# Patient Record
Sex: MALE | Race: NATIVE HAWAIIAN OR OTHER PACIFIC ISLANDER | URBAN - METROPOLITAN AREA
[De-identification: names, ages, dates, MRNs, and addresses within clinical notes are randomized per-mention and may not be internally consistent; named-entity substitution may affect disease eponyms.]

---

## 2018-02-15 ENCOUNTER — OFFICE VISIT (OUTPATIENT)
Dept: FAMILY MEDICINE | Facility: CLINIC | Age: 53
End: 2018-02-15

## 2018-02-15 VITALS
RESPIRATION RATE: 16 BRPM | TEMPERATURE: 98.3 F | HEIGHT: 65 IN | WEIGHT: 210 LBS | HEART RATE: 87 BPM | BODY MASS INDEX: 34.99 KG/M2 | DIASTOLIC BLOOD PRESSURE: 132 MMHG | SYSTOLIC BLOOD PRESSURE: 200 MMHG | OXYGEN SATURATION: 97 %

## 2018-02-15 DIAGNOSIS — E11.8 TYPE 2 DIABETES MELLITUS WITH COMPLICATION, WITHOUT LONG-TERM CURRENT USE OF INSULIN (H): ICD-10-CM

## 2018-02-15 DIAGNOSIS — I10 BENIGN ESSENTIAL HYPERTENSION: Primary | ICD-10-CM

## 2018-02-15 LAB
ALBUMIN SERPL-MCNC: 3.4 G/DL (ref 3.4–5)
ALP SERPL-CCNC: 130 U/L (ref 40–150)
ALT SERPL W P-5'-P-CCNC: 98 U/L (ref 0–70)
ANION GAP SERPL CALCULATED.3IONS-SCNC: 11 MMOL/L (ref 3–14)
AST SERPL W P-5'-P-CCNC: 31 U/L (ref 0–45)
BILIRUB SERPL-MCNC: 0.4 MG/DL (ref 0.2–1.3)
BUN SERPL-MCNC: 12 MG/DL (ref 7–30)
CALCIUM SERPL-MCNC: 8.6 MG/DL (ref 8.5–10.1)
CHLORIDE SERPL-SCNC: 102 MMOL/L (ref 94–109)
CHOLEST SERPL-MCNC: 279 MG/DL
CO2 SERPL-SCNC: 28 MMOL/L (ref 20–32)
CREAT SERPL-MCNC: 0.64 MG/DL (ref 0.66–1.25)
GFR SERPL CREATININE-BSD FRML MDRD: >90 ML/MIN/1.7M2
GLUCOSE SERPL-MCNC: 339 MG/DL (ref 70–99)
HBA1C MFR BLD: 11 % (ref 4.3–6)
HDLC SERPL-MCNC: 33 MG/DL
LDLC SERPL CALC-MCNC: ABNORMAL MG/DL
NONHDLC SERPL-MCNC: 246 MG/DL
POTASSIUM SERPL-SCNC: 3.3 MMOL/L (ref 3.4–5.3)
PROT SERPL-MCNC: 7.6 G/DL (ref 6.8–8.8)
SODIUM SERPL-SCNC: 141 MMOL/L (ref 133–144)
TRIGL SERPL-MCNC: 1204 MG/DL

## 2018-02-15 RX ORDER — HYDROCHLOROTHIAZIDE 12.5 MG/1
12.5 CAPSULE ORAL EVERY MORNING
Qty: 30 CAPSULE | Refills: 0 | Status: SHIPPED | OUTPATIENT
Start: 2018-02-15

## 2018-02-15 RX ORDER — LOSARTAN POTASSIUM 25 MG/1
25 TABLET ORAL DAILY
Qty: 30 TABLET | Refills: 0 | Status: SHIPPED | OUTPATIENT
Start: 2018-02-15 | End: 2018-03-17

## 2018-02-15 RX ORDER — ATORVASTATIN CALCIUM 40 MG/1
40 TABLET, FILM COATED ORAL AT BEDTIME
Qty: 30 TABLET | Refills: 0 | Status: SHIPPED | OUTPATIENT
Start: 2018-02-15

## 2018-02-15 RX ORDER — IBUPROFEN 200 MG
200 TABLET ORAL EVERY 4 HOURS PRN
COMMUNITY

## 2018-02-15 NOTE — Clinical Note
I have completed my note, please review, add tie in statement and close encounter.   Thanks!   Eileen [email or web address]

## 2018-02-15 NOTE — MR AVS SNAPSHOT
After Visit Summary   2/15/2018    Shon Adan    MRN: [id number]           Patient Information     Date Of Birth          1965        Visit Information        Provider Department      2/15/2018 6:00 PM Clinician, [facility]        Today's Diagnoses     Benign essential hypertension    -  1    Type 2 diabetes mellitus with complication, without long-term current use of insulin (H)          Care Instructions    Patient Visit Summary    Patient Name: Shon Adan  MRN: [id number]    Date of Visit: 2/15/2018    Principle Diagnosis: Hypertension, uncontrolled  Diabetes Mellitus Type II    Physician's Recommendations/Instructions: Start all medications as directed. Check your blood pressure tomorrow morning and follow up in the clinic next week to make sure your blood pressure and glucose levels are controlled by your medications. In the meantime, if you begin to experience symptoms related to your elevated blood pressure, report to the emergency room or urgent care immediately. Symptoms include: severe headache, lightheadedness, dizziness, severe abdominal pain, or changes in vision (i.e. spotted vision or darkening as if a shade is falling over your eyes).     To prevent cuts or development of ulcers on your feet, be sure to check your feet regularly. Changing your socks while working long shifts will help keep your feet dry and prevent cracking of the skin.    Please join us for the following specialty nights:  Ophthalmology Night (eye care): 3/26/2018  (Optional: Foot Night: 2/22/2018)      Lab Tests Performed: Hemoglobin A1c, Lipid Panel, Comprehensive Metabolic Panel, Glucose     Follow Up/Results: Please return to the clinic within one week (Monday or Thursday) to recheck blood pressure and discuss any changes to medications or symptoms. We will call you with your results in the meantime.      Physician: Dr. Devon Callahan  [facility]          Follow-ups after your  "visit        Who to contact     Please call your clinic at [phone] to:    Ask questions about your health    Make or cancel appointments    Discuss your medicines    Learn about your test results    Speak to your doctor            Additional Information About Your Visit        iCopyrighthart Information     Cenify is an electronic gateway that provides easy, online access to your medical records. With Cenify, you can request a clinic appointment, read your test results, renew a prescription or communicate with your care team.     To sign up for Cenify visit the website at [email or web address]   You will be asked to enter the access code listed below, as well as some personal information. Please follow the directions to create your username and password.     Your access code is: [email or web address]  Expires: 2018  7:58 PM     Your access code will  in 90 days. If you need help or a new code, please contact your [facility] or call [phone] for assistance.        Care EveryWhere ID     This is your Care EveryWhere ID. This could be used by other organizations to access your [place] medical records  [id number]        Your Vitals Were     Pulse Temperature Respirations Height Pulse Oximetry BMI (Body Mass Index)    87 98.3  F (36.8  C) (Oral) 16 5' 5\" (165.1 cm) 97% 34.95 kg/m2       Blood Pressure from Last 3 Encounters:   02/15/18 (!) 200/132    Weight from Last 3 Encounters:   02/15/18 210 lb (95.3 kg)              We Performed the Following     Comprehensive metabolic panel     Hemoglobin A1c     Lipid panel reflex to direct LDL Fasting          Today's Medication Changes          These changes are accurate as of 2/15/18  8:47 PM.  If you have any questions, ask your nurse or doctor.               Start taking these medicines.        Dose/Directions    aspirin 81 MG EC tablet   Used for:  Type 2 diabetes mellitus with complication, without long-term current use of " insulin (H)   Started by:  Dali Ragland Ump        Dose:  81 mg   Take 1 tablet (81 mg) by mouth daily   Quantity:  30 tablet   Refills:  0       atorvastatin 40 MG tablet   Commonly known as:  LIPITOR   Used for:  Benign essential hypertension, Type 2 diabetes mellitus with complication, without long-term current use of insulin (H)   Started by:  Dali Ragland Ump        Dose:  40 mg   Take 1 tablet (40 mg) by mouth At Bedtime   Quantity:  30 tablet   Refills:  0       hydrochlorothiazide 12.5 MG capsule   Commonly known as:  MICROZIDE   Used for:  Benign essential hypertension   Started by:  Dali Ragland Ump        Dose:  12.5 mg   Take 1 capsule (12.5 mg) by mouth every morning   Quantity:  30 capsule   Refills:  0       losartan 25 MG tablet   Commonly known as:  COZAAR   Used for:  Type 2 diabetes mellitus with complication, without long-term current use of insulin (H)   Started by:  Dali Ragland Ump        Dose:  25 mg   Take 1 tablet (25 mg) by mouth daily   Quantity:  30 tablet   Refills:  0       metFORMIN 500 MG tablet   Commonly known as:  GLUCOPHAGE   Used for:  Type 2 diabetes mellitus with complication, without long-term current use of insulin (H)   Started by:  Dali Ragland Ump        Dose:  500 mg   Take 1 tablet (500 mg) by mouth daily (with breakfast)   Quantity:  30 tablet   Refills:  0            Where to get your medicines      Some of these will need a paper prescription and others can be bought over the counter.  Ask your nurse if you have questions.     Bring a paper prescription for each of these medications     aspirin 81 MG EC tablet    atorvastatin 40 MG tablet    hydrochlorothiazide 12.5 MG capsule    losartan 25 MG tablet    metFORMIN 500 MG tablet                Primary Care Provider    None Specified       No primary provider on file.        Equal Access to Services     [facility] MARISOL AH: Shayy Vega, derek castelan, armaan garnica  amina cornejochrisbarbara velasco'aabruce ah. So [facility] [phone].    ATENCIÓN: Si milady scales, tiene a hines disposición servicios gratuitos de asistencia lingüística. Christopher al [phone].    We comply with applicable federal civil rights laws and [place] laws. We do not discriminate on the basis of race, color, national origin, age, disability, sex, sexual orientation, or gender identity.            Thank you!     Thank you for choosing [facility]  for your care. Our goal is always to provide you with excellent care. Hearing back from our patients is one way we can continue to improve our services. Please take a few minutes to complete the written survey that you may receive in the mail after your visit with us. Thank you!             Your Updated Medication List - Protect others around you: Learn how to safely use, store and throw away your medicines at www.disposemymeds.org.          This list is accurate as of 2/15/18  8:47 PM.  Always use your most recent med list.                   Brand Name Dispense Instructions for use Diagnosis    aspirin 81 MG EC tablet     30 tablet    Take 1 tablet (81 mg) by mouth daily    Type 2 diabetes mellitus with complication, without long-term current use of insulin (H)       atorvastatin 40 MG tablet    LIPITOR    30 tablet    Take 1 tablet (40 mg) by mouth At Bedtime    Benign essential hypertension, Type 2 diabetes mellitus with complication, without long-term current use of insulin (H)       hydrochlorothiazide 12.5 MG capsule    MICROZIDE    30 capsule    Take 1 capsule (12.5 mg) by mouth every morning    Benign essential hypertension       ibuprofen 200 MG tablet    ADVIL/MOTRIN     Take 200 mg by mouth every 4 hours as needed        losartan 25 MG tablet    COZAAR    30 tablet    Take 1 tablet (25 mg) by mouth daily    Type 2 diabetes mellitus with complication, without long-term current use of insulin (H)       metFORMIN 500 MG tablet    GLUCOPHAGE    30 tablet    Take  1 tablet (500 mg) by mouth daily (with breakfast)    Type 2 diabetes mellitus with complication, without long-term current use of insulin (H)

## 2018-02-16 LAB — LDLC SERPL DIRECT ASSAY-MCNC: 128 MG/DL

## 2018-02-16 NOTE — PROGRESS NOTES
Patient was seen today for high blood pressure and diabetes. Patient saw [facility] this morning and BP was 200/142. BP was rechecked in clinic and was 200/132. Patient stopped all 3 BP medications 6-7 months ago, as well as stopped Metformin taken for diabetes 6-7 months ago as well. Patient complains of weakness, fatigue, and headaches possibly associated with not taking medications for his BP and diabetes. Patient says 1 BP medication makes him cough, but is not allergic to anything else that he knows of. These were the main chief complaints.    Patient however, also complains of shoulder pain that started 2 weeks ago after pulling fabric up off the ground. He says he takes 2 tablets of ibuprofen 2x/day for this. He said the pain started 2 weeks ago and says it hurts move and lift things, but not when not doing anything.    Patient also complains of problems with teeth and will be consulted with by dental clinicians.    Caffeine intake is 1-2 cups of coffee/day and 1-3 sodas per day. No alcohol, tobacco, illicit drugs, or herbal supplements.    Scored a PHQ-2 of 0.    sn Darshana

## 2018-02-16 NOTE — PROGRESS NOTES
MEDICINE NOTE    SUBJECTIVE:  Shon is a 52 year old male presenting to clinic today with a long standing history of hypertension and type II diabetes mellitus. He's here today requesting medications for both of these conditions. Shon has been off all medications for 7 months due to a loss of insurance, but is looking forward to getting treatment again as he knows it is important to treat these medical conditions. Prior to his loss of insurance, he had been treated for his hypertension for 15 years with three medications. He had a constant dry cough with lisinopril. Shon had also previously been treated for his diabetes with metformin. Shon has been hospitalized at least once for hypertension; at that time (over a year ago) his blood pressure was [id number]. He's presenting today because of a high blood pressure reading this morning at the [facility] and because he has noticed constant weakness, vision changes, increased thirst and urination, and numbness and tingling in his big toes in the past few months. His vision has become blurry, but he hasn't noticed any acute changes. Shon also reports a family history of high blood pressure, and seems well educated on the importance of diet and lifestyle. 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 He has experienced this several times in the past six months.     REVIEW OF SYSTEMS:  Gen: no fevers, some chills noted at night  Eyes: recent (past few months) of increased blurry vision   Ears, Noses, Mouth, Throat: no ringing in ears or hearing change  Cardiac: intermittent chest pain as described above  Lungs: no dyspnea, cough, or shortness of breath  GI: no nausea, vomiting, diarrhea or constipation, no abdominal pain  : polyuria in the past few weeks; urine is clear/light yellow  Musculoskeletal:  "shoulder injury two weeks ago causing discomfort  Neuro: reports numbness in big toes and intermittent finger numbness, generalized weakness  Endo: polyuria, polydipsia  Allergy: no environmental or drug allergies  Psych: no depression or anxiety    No past medical history on file.    No past surgical history on file.    No family history on file.    Social History     Social History     Marital status:      Spouse name: N/A     Number of children: N/A     Years of education: N/A     Occupational History     Not on file.     Social History Main Topics     Smoking status: Never Smoker     Smokeless tobacco: Not on file     Alcohol use No     Drug use: Not on file     Sexual activity: Not on file     Other Topics Concern     Not on file     Social History Narrative    2/15/18 Patient previously had health insurance through his full time job but switched jobs, and this new job does not offer insurance. He is interested in obtaining medical insurance. We offered him information on [facility] [place], which after learning about his social history we believe is his best option We gave him the phone number for a financial counselor for [facility] [place]. -KG, KP       OBJECTIVE:  Physical Exam:  BP (!) 200/132 (BP Location: Left arm)  Pulse 87  Temp 98.3  F (36.8  C) (Oral)  Resp 16  Ht 5' 5\" (165.1 cm)  Wt 210 lb (95.3 kg)  SpO2 97%  BMI 34.95 kg/m2  Constitutional: no distress, comfortable, pleasant   Eyes: anicteric, normal extra-ocular movements, no papilledema seen but some diabetes-related train tracking was seen on exam    Cardiovascular: regular rate and rhythm, normal S1 and S2, no murmurs, rubs or gallops, peripheral pulses full and symmetric   Respiratory: clear to auscultation, no wheezes or crackles, normal breath sounds   Gastrointestinal: positive bowel sounds, nontender, no hepatosplenomegaly, no masses   Musculoskeletal:  no edema  Skin: feet examined, no lesions identified   Neurological: cranial " nerves intact, sensation intact on all planes of both feet   Psychological: appropriate mood     ASSESSMENT/PLAN:  Diagnoses and all orders for this visit:    Benign essential hypertension  -     hydrochlorothiazide (MICROZIDE) 12.5 MG capsule; Take 1 capsule (12.5 mg) by mouth every morning  -     atorvastatin (LIPITOR) 40 MG tablet; Take 1 tablet (40 mg) by mouth At Bedtime  -     LDL cholesterol direct    Type 2 diabetes mellitus with complication, without long-term current use of insulin (H)  -     Hemoglobin A1c  -     Comprehensive metabolic panel  -     Lipid panel reflex to direct LDL Fasting  -     Cancel: Glucose  -     aspirin 81 MG EC tablet; Take 1 tablet (81 mg) by mouth daily  -     Discontinue: metFORMIN (GLUCOPHAGE) 500 MG tablet; Take 1 tablet (500 mg) by mouth 2 times daily (with meals) Take 1 tablet (500 mg) by mouth daily with meals for one week.  Take 2 tablets (1000 mg) by mouth twice daily with meals after that.  -     losartan (COZAAR) 25 MG tablet; Take 1 tablet (25 mg) by mouth daily  -     atorvastatin (LIPITOR) 40 MG tablet; Take 1 tablet (40 mg) by mouth At Bedtime  -     metFORMIN (GLUCOPHAGE) 500 MG tablet; Take 1 tablet (500 mg) by mouth daily (with breakfast)    A/P:   1. Discussed symptoms to look out for due to extremely high blood pressure: if the patient is experiencing acute vision changes, extreme dizziness, an extremely bad headache, chest pain, or abdominal pain he should present to an emergency department as soon as possible.  2. Hypertension: address extremely high blood pressure (200/132) by starting patient on hydrochlorothiazide and losartan  3. Type II diabetes mellitus: begin metformin   4. Reduce cardiovascular risk by starting atorvastatin and aspirin  5. Check A1C, CMP, a lipid panel, and glucose (343 in clinic tonight)   6. Return to clinic in one week for follow up on labs, check blood pressure, and adjust medication as needed to attain optimal control of blood  pressure and diabetes.        Med Clinician: Nisa Penn  Preceptor: Devon Callahan, MD    In supervising the MARIJA Penn, I repeated the exam documented above.  I have reviewed and verified the student s documentation.  52 year old male with HTN, DM, presenting for med refill. BP notable very high today, however, patient without symptoms of end organ damage. Also with sxs consistent with hyperglycemia. Advised patient re: importance of taking meds and close follow up. Have restarted some meds at lower doses as above and will have patient RTC in 1 week to evaluate medication effect and titrate as needed. Also labs as above for baseline.     Supervising Provider: Devon Callahan, MD on 2/20/2018 at 4:41 PM

## 2018-02-16 NOTE — PROGRESS NOTES
"NUTRITION NOTE    Patient Name: Shon Adan   MRN: [id number]    Reason for assessment:  High BMI, Hypertension, history of diabetes.    Anthropometrics  Admit Weight:   Height: 5' 5\"   Current weight: 210 lbs 0 oz   Body mass index is 34.95 kg/(m^2).     Assessment  Relevant nutritional labs: BP: 200/132 no lab results    Nutrition problem: limited adherence to nutrition related recommendations    Related to: food and nutrition related knowledge deficit    As indicated by:  diet history and self report    Nutrition Counseling: Patient described a typical day before  which included a vegetable juice in the morning breakfast with coffee, two ham sandwiches at lunch, and a dinner at midnight of soup, meat and tortillas. Patient consumed soda, gatorade, and coffee throughout the day and no snacks in between meals. Patient saw a dietitian when he was first diagnosed two years ago, had some knowledge on what diabetes was and meat portion sizes. He has a busy schedule which limits ability to consume regular meals throughout the day and his need for caffeine to keep up his energy.    Those present during counseling: Nutrition clinician and shadow    Instructed on: Adding a snack between meals and decreasing soda consumption by one soda per day. We explained and provided Use MyPlate to patient to demonstrate what a typical/ ideal meal looks like.    Understanding of diet demonstrated: Was willing to try these changes, patient seemed motivated to change    Predicted compliance: compliant most of the time    Plan: Add snack in between meals and decrease soda consumption by one per day    Nutrition Clinician: Valentina High  Preceptor: Edi Garcia, DORA, CORNELIUS    In supervising the nutrition student, I repeated the exam documented above.  I have reviewed and verified the student s documentation.  Supervising Provider: Edi Garcia, DORA, CORNELIUS    "

## 2018-02-16 NOTE — PATIENT INSTRUCTIONS
Patient Visit Summary    Patient Name: Shon Adan  MRN: [id number]    Date of Visit: 2/15/2018    Principle Diagnosis: Hypertension, uncontrolled  Diabetes Mellitus Type II    Physician's Recommendations/Instructions: Start all medications as directed. Check your blood pressure tomorrow morning and follow up in the clinic next week to make sure your blood pressure and glucose levels are controlled by your medications. In the meantime, if you begin to experience symptoms related to your elevated blood pressure, report to the emergency room or urgent care immediately. Symptoms include: severe headache, lightheadedness, dizziness, severe abdominal pain, or changes in vision (i.e. spotted vision or darkening as if a shade is falling over your eyes).     To prevent cuts or development of ulcers on your feet, be sure to check your feet regularly. Changing your socks while working long shifts will help keep your feet dry and prevent cracking of the skin.    Please join us for the following specialty nights:  Ophthalmology Night (eye care): 3/26/2018  (Optional: Foot Night: 2/22/2018)      Lab Tests Performed: Hemoglobin A1c, Lipid Panel, Comprehensive Metabolic Panel, Glucose     Follow Up/Results: Please return to the clinic within one week (Monday or Thursday) to recheck blood pressure and discuss any changes to medications or symptoms. We will call you with your results in the meantime.      Physician: Dr. Devon Callahan  [facility]

## 2018-02-16 NOTE — PROGRESS NOTES
[facility] Progress Note    Chief complaint: Patient is in clinic today after getting a blood pressure reading of 200/142 mmHg at the [facility].     Subjective:  EP is a 52-year-old Filipino man who presents to the clinic after getting a blood pressure measurement of 200/142 mmHg at the [facility] this morning.  He reports that he has had hypertension for 15 years.  Patient reports that he was previously on amlodipine, lisinopril, and metoprolol for his blood pressure.  He has not taken any medications for 7 months due to losing his health insurance.  He reports getting a cough from from his medications.  Patient also reports that he visited the ER a year ago after getting a blood pressure of 250/150 mmHg.       Patient also reports that he was diagnosed with Type II Diabetes Mellitus 2 years ago, after which he was started on metformin.  He reports symptoms of dry mouth, thirst, and increased frequency of urination in the past few weeks.  He also reports numbness on his left big toe as well as numbness in his hands at least 5 days a week.  He reports that the numbness in his hands are worse in the morning and resolves with activity.  Patient reports that metformin worked well for him when he was taking it, however he reports that he had stomach upset despite taking it with meals.    Patient currently takes ibuprofen 200 mg tablets for should pain.  He states that it works well for him and denies any side effects from it.      Current Outpatient Prescriptions   Medication Sig Dispense Refill     aspirin 81 MG EC tablet Take 1 tablet (81 mg) by mouth daily 30 tablet 0     losartan (COZAAR) 25 MG tablet Take 1 tablet (25 mg) by mouth daily 30 tablet 0     hydrochlorothiazide (MICROZIDE) 12.5 MG capsule Take 1 capsule (12.5 mg) by mouth every morning 30 capsule 0     atorvastatin (LIPITOR) 40 MG tablet Take 1 tablet (40 mg) by mouth At Bedtime 30 tablet 0     metFORMIN (GLUCOPHAGE) 500 MG tablet Take 1  "tablet (500 mg) by mouth daily (with breakfast) 30 tablet 0     ibuprofen (ADVIL/MOTRIN) 200 MG tablet Take 200 mg by mouth every 4 hours as needed       [DISCONTINUED] metFORMIN (GLUCOPHAGE) 500 MG tablet Take 1 tablet (500 mg) by mouth 2 times daily (with meals) Take 1 tablet (500 mg) by mouth daily with meals for one week.  Take 2 tablets (1000 mg) by mouth twice daily with meals after that. 60 tablet 0         Objective:   BP (!) 200/132 (BP Location: Left arm)  Pulse 87  Temp 98.3  F (36.8  C) (Oral)  Resp 16  Ht 5' 5\" (165.1 cm)  Wt 210 lb (95.3 kg)  SpO2 97%  BMI 34.95 kg/m2    Assessment:     Condition 1- Hypertension - Uncontrolled  DTP: Needs additional drug therapy  Rationale: Patient has a blood pressure reading of 200/132 mmHg in clinic today.  According to the 2017 ACC/AHA Hypertension Guideline, initiating two medication therapies is recommended for this patient.  Therapeutic options include ACE inhibitors, ARBs, and thiazides.  Since the patient was previously on lisinopril and developed a cough, starting on an ACE inhibitor would not be ideal.  Hydrochlorothiazide 12.5 mg and losartan 25 mg would benefit this patient.  The goal of therapy is to decrease his blood pressure to less than 140/90 mmHg within 1 month.  The patient would also benefit from incorporating a DASH diet and at least 30 minutes of aerobic activity for 5 days a week.  A discussion on lifestyle changes at a future visit would benefit the patient.    Condition 2- Type II Diabetes Mellitus - Uncontrolled  DTP: Needs additional drug therapy  Rationale: The patient had a random finger-stick blood glucose reading of 229 mg/dL in the clinic today.  According to the 2018 ADA Guideline, the first-therapy for Type II DM is metformin.  Since the patient was previously on metformin and experienced upset stomach from it, initiating a titration of starting metformin 500 mg daily is appropriate at this time.  The goal of therapy is to " reduce the patient's A1C to less than 7% within 3 months.       Condition 3- ASCVD Prevention  DTP: Needs additional drug therapy  Rationale:  According to the 2018 ADA Guideline, all patients with diabetes at or over the age of 40 without clinical ASCVD should be initiated on a moderate-intensity statin.  However, per the guideline, a high intensity statin may be initiated instead based on a risk-benefit profile and presence of ASCVD risk factors.  The patient needs an additional drug therapy for the primary prevention of ASCVD event.  Due to his comorbid conditions, the patient is a candidate for a high-intensity statin.  High-intensity statin options include rosuvastatin 20-40 mg and atorvastatin 40-80 mg.  Atorvastatin 40 mg is an appropriate option for this patient.  The goal of therapy is to prevent an ASCVD event indefinitely.     DTP: Needs additional drug therapy  Rationale:  According to the 2018 ADA Guideline, aspirin therapy should be initiated for primary prevention of cardiovascular disease in adults age 50 years or more with a additional risk factors.  The patient needs an additional drug therapy for the primary prevention of ASCVD.  Aspirin options include  mg daily.  Since the patient is aspirin-naïve and does not currently have an ASCVD, aspirin 81 mg daily is recommended per the guideline.  The goal of therapy is to prevent an ASCVD event indefinitely.      Plan:  1. Initiate hydrochlorothiazide 12.5 mg capsule - Take 1 capsule by mouth every morning.  2. Initiate losartan 25 mg tablet - Take 1 tablet by mouth daily.  3. Initiate metformin 500 mg tablet - Take 1 tablet by mouth daily with breakfast.  4. Initiate atorvastatin 40 mg tablet - Take 1 tablet by mouth at bedtime.  5. Initiate aspirin 81 mg tablet - Take 1 tablet by mouth daily.  6. Obtain an A1C, BMP, and FLP today in clinic.    Pharmacy Follow-Up Plan (Method, Date, Parameters):  Follow-up by phone on Monday (2/19/18) to assess  for safety of the new medications initiated today by checking for the following:     hydrochlorothiazide 12.5 mg  Safety: dizziness, fatigue, and loss of coordination.    losartan 25 mg  Safety: dizziness, backache, cough, and nasal congestion.  Efficacy: decreasing blood pressure.    metformin 500 mg  Safety: nausea, vomiting, diarrhea, and indigestion.    atorvastatin 40 mg  Safety: muscle pain, nausea, headaches, and dark brown urine.    aspirin 81 mg  Safety: hypersensitivity, bleeding, and easy bruising.    Follow-up in clinic in 4 weeks. Evaluate for medication efficacy by checking blood pressure in clinic. Check SCr, potassium, and sodium levels (losartan 25 mg and hydrochlorothiazide 12.5 mg).  During this visit, consider a FLP to check efficacy for atorvastatin. Assess the patient's readiness for lifestyle modifications and educate on the benefits of physical activity and a healthy diet.    PharmCare Clinician: Eileen Robles  Pharmacy Preceptor: Jose Juan De La Torre    _____________________________  Preceptor Use Only:  In supervising the student, I have reviewed and verified the student's documentation and found it to be correct and complete.     Patient presented to clinic with the desire to restart medications for hypertension and metformin.  Due to patient losing his insurance has not been on medications in the past.  I was a part of the discussion regarding restarting the patient's medications to address symptoms and signs. Lab values were obtained and will need to be followed up on during his next visit.     Preceptor Signature:Amado De La Torre, Pharm.D, BCPS    HPI      ROS      Physical Exam

## 2018-02-18 ENCOUNTER — TELEPHONE (OUTPATIENT)
Dept: FAMILY MEDICINE | Facility: CLINIC | Age: 53
End: 2018-02-18

## 2018-02-19 NOTE — TELEPHONE ENCOUNTER
I called and got Shon's answering machine, but did not leave a message at this time. I will call again tomorrow in hopes of speaking to Shon at that time and updating him on his lab results.

## 2018-02-22 ENCOUNTER — APPOINTMENT (OUTPATIENT)
Dept: FAMILY MEDICINE | Facility: CLINIC | Age: 53
End: 2018-02-22

## 2024-09-16 ENCOUNTER — OFFICE VISIT (OUTPATIENT)
Dept: FAMILY MEDICINE | Facility: CLINIC | Age: 59
End: 2024-09-16

## 2024-09-16 VITALS
RESPIRATION RATE: 18 BRPM | OXYGEN SATURATION: 97 % | HEART RATE: 76 BPM | TEMPERATURE: 97.9 F | SYSTOLIC BLOOD PRESSURE: 200 MMHG | HEIGHT: 65 IN | WEIGHT: 189.4 LBS | BODY MASS INDEX: 31.56 KG/M2 | DIASTOLIC BLOOD PRESSURE: 130 MMHG

## 2024-09-16 DIAGNOSIS — E11.9 TYPE 2 DIABETES MELLITUS WITHOUT COMPLICATION, WITHOUT LONG-TERM CURRENT USE OF INSULIN (H): ICD-10-CM

## 2024-09-16 DIAGNOSIS — E78.5 HYPERLIPIDEMIA LDL GOAL <70: ICD-10-CM

## 2024-09-16 DIAGNOSIS — I16.0 HYPERTENSIVE URGENCY: Primary | ICD-10-CM

## 2024-09-16 LAB
ANION GAP SERPL CALCULATED.3IONS-SCNC: 11 MMOL/L (ref 7–15)
BUN SERPL-MCNC: 12.7 MG/DL (ref 6–20)
CALCIUM SERPL-MCNC: 9.1 MG/DL (ref 8.8–10.4)
CHLORIDE SERPL-SCNC: 101 MMOL/L (ref 98–107)
CHOLEST SERPL-MCNC: 234 MG/DL
CREAT SERPL-MCNC: 0.72 MG/DL (ref 0.67–1.17)
EGFRCR SERPLBLD CKD-EPI 2021: >90 ML/MIN/1.73M2
FASTING STATUS PATIENT QL REPORTED: ABNORMAL
FASTING STATUS PATIENT QL REPORTED: ABNORMAL
GLUCOSE SERPL-MCNC: 112 MG/DL (ref 70–99)
HBA1C MFR BLD: 9.1 %
HCO3 SERPL-SCNC: 27 MMOL/L (ref 22–29)
HDLC SERPL-MCNC: 43 MG/DL
LDLC SERPL CALC-MCNC: 123 MG/DL
NONHDLC SERPL-MCNC: 191 MG/DL
POTASSIUM SERPL-SCNC: 3.1 MMOL/L (ref 3.4–5.3)
SODIUM SERPL-SCNC: 139 MMOL/L (ref 135–145)
TRIGL SERPL-MCNC: 338 MG/DL

## 2024-09-16 PROCEDURE — 83036 HEMOGLOBIN GLYCOSYLATED A1C: CPT

## 2024-09-16 PROCEDURE — 80061 LIPID PANEL: CPT

## 2024-09-16 PROCEDURE — 82043 UR ALBUMIN QUANTITATIVE: CPT

## 2024-09-16 PROCEDURE — 80048 BASIC METABOLIC PNL TOTAL CA: CPT

## 2024-09-16 RX ORDER — LOSARTAN POTASSIUM 25 MG/1
25 TABLET ORAL DAILY
Qty: 30 TABLET | Refills: 2 | Status: SHIPPED | OUTPATIENT
Start: 2024-09-16

## 2024-09-16 RX ORDER — HYDROCHLOROTHIAZIDE 12.5 MG/1
12.5 CAPSULE ORAL DAILY
Qty: 30 CAPSULE | Refills: 2 | Status: SHIPPED | OUTPATIENT
Start: 2024-09-16

## 2024-09-16 RX ORDER — METFORMIN HCL 500 MG
500 TABLET, EXTENDED RELEASE 24 HR ORAL
Qty: 30 TABLET | Refills: 2 | Status: SHIPPED | OUTPATIENT
Start: 2024-09-16

## 2024-09-16 RX ORDER — ATORVASTATIN CALCIUM 40 MG/1
40 TABLET, FILM COATED ORAL DAILY
Qty: 30 TABLET | Refills: 2 | Status: SHIPPED | OUTPATIENT
Start: 2024-09-16

## 2024-09-16 NOTE — PROGRESS NOTES
MEDICINE NOTE    SUBJECTIVE:  Mr. Adan is a 59 year-old male with a Mhx significant for HTN, HLD, and T2DM and a Fhx of HTN and T2DM who presents today after running out of his HTN medications 6 months ago and his metformin 3 months ago.  He presents with complaints of headaches, ringing in his ears, and an at-home BP reading of 224/144 and is seeking refills of his medications.  He has no current primary care physician or insurance plan.    REVIEW OF SYSTEMS:  Gen: no fevers, night sweats or weight change  Eyes: dizziness, cloudy vision  Ears, Noses, Mouth, Throat: ringing in ears  Cardiac: no pain with walking, history of chest pain  Lungs: no dyspnea, cough, or shortness of breath  : no change in urine frequency or urgency  Endo: no polydipsia  Allergy: no environmental or drug allergies  Psych: no depression or anxiety    No past medical history on file.    No past surgical history on file.    No family history on file.    Social History     Socioeconomic History    Marital status:      Spouse name: Not on file    Number of children: Not on file    Years of education: Not on file    Highest education level: Not on file   Occupational History    Not on file   Tobacco Use    Smoking status: Never    Smokeless tobacco: Never   Substance and Sexual Activity    Alcohol use: No    Drug use: No    Sexual activity: Not on file   Other Topics Concern    Not on file   Social History Narrative    2/15/18 Patient previously had health insurance through his full time job but switched jobs, and this new job does not offer insurance. He is interested in obtaining medical insurance. We offered him information on [facility] insurance, which after learning about his social history we believe is his best option We gave him the phone number for a financial counselor for [facility] insurance. -ALIE, DAX        09/16/2024 Full med. Provided resources on applying for health insurance, counseling and therapy, low-cost dental care, low-cost  "pharmacies, low-cost primary care, and veterinary services. Referral to [facility]. -CB     Social Determinants of Health     Financial Resource Strain: High Risk (1/1/2022)    [facility] from [facility]    Financial Resource Strain     Difficulty of Paying Living Expenses: Not on file     Difficulty of Paying Living Expenses: Not on file   Food Insecurity: Not on file   Transportation Needs: Not on file   Physical Activity: Not on file   Stress: Not on file   Social Connections: Unknown (4/18/2024)    Received from [facility]    Social Connections     Frequency of Communication with Friends and Family: Not on file   Interpersonal Safety: Not on file   Housing Stability: Not on file       OBJECTIVE:  Physical Exam:  BP (!) 200/130 (BP Location: Left arm)   Pulse 76   Temp 97.9  F (36.6  C) (Oral)   Resp 18   Ht 1.651 m (5' 5\")   Wt 85.9 kg (189 lb 6.4 oz)   SpO2 97%   BMI 31.52 kg/m    Constitutional: no distress, comfortable, pleasant   Cardiovascular: regular rate and rhythm, normal S1 and S2, no murmurs, rubs or gallops, peripheral pulses full and symmetric   Respiratory: clear to auscultation, no wheezes or crackles, normal breath sounds     ASSESSMENT/PLAN:  Shon was seen today for hypertension.    Diagnoses and all orders for this visit:    Hypertensive urgency  -     Albumin Random Urine Quantitative with Creat Ratio; Future  -     Basic metabolic panel; Future  -     losartan (COZAAR) 25 MG tablet; Take 1 tablet (25 mg) by mouth daily.  -     hydrochlorothiazide (MICROZIDE) 12.5 MG capsule; Take 1 capsule (12.5 mg) by mouth daily.    Type 2 diabetes mellitus without complication, without long-term current use of insulin (H)  -     Hemoglobin A1c; Future  -     metFORMIN (GLUCOPHAGE XR) 500 MG 24 hr tablet; Take 1 tablet (500 mg) by mouth daily (with dinner).    Hyperlipidemia LDL goal <70  -     Lipid panel reflex to direct LDL Fasting; " Future  -     atorvastatin (LIPITOR) 40 MG tablet; Take 1 tablet (40 mg) by mouth daily.    Mr. Adan is a 59 year-old man with a history of HTN, HLD, and T2DM presenting today after a high at-home BP reading and symptoms of HTN including headache, cloudy vision, dizziness, and ringing in the ears following a 6 month break in his regular medication routine.  Given his presentation and in-clinic BP reading, we will refill his Losartan and Hydrochlorthiazide for his HTN as well as refill his Atorvastatin for his HLD and refill his Metformin for T2DM.  Additionally, we will be collecting blood for BMP, A1c, and lipid panel as well as a UA urine microalbuminuria.  Finally, our patient received counseling from our nutritionist on staff tonight and received handouts and a referral to [facility] for primary care follow-up for better management of his HTN, HLD, and T2DM.  We also counseled the patient to seek emergency medical assistance if his symptoms worsen--specifically, if his headache is severe and persistent, if his ability to breathe worsens, if he notices edema in his legs, or if he experiences any chest pain.    Med Clinician: Val Mahan  Preceptor: Dr. Yolanda Goel ( Resident)/Dr. Tashia Perry     Patient seen with the team by Dr. Goel. I reviewed the case and discussed the plan with Dr. Goel.   Tashia Perry MD

## 2024-09-16 NOTE — PROGRESS NOTES
[facility] Pharm[facility] Progress Note    Chief complaint: High blood pressure    Last date of full med: 9/16/2024    Subjective:   Shon Adan (ZIGGY) is a 59 year old  male who comes to the clinic for his high blood pressure. ZIGGY reports not having any of his medications for 6 months and wants to refill his medications. ZIGGY has no known drug allergies and has a family history of hypertension and diabetes. ZIGGY does not have insurance and works at a [employer]. He reports going to the hospital 3 years ago to check his heart for some chest pain and was given nitroglycerin as a result. ZIGGY exercises everyday for 20 minutes by taking the dog out for a walk. EP drinks 1 cup of coffee in the morning and occasionally drinks 2 cups in the morning as well. He occasionally drinks 3 beers but not to often. He does not use any tobacco or any social drugs.     ZIGGY reports good adherence to his medications when he had his medications but hasn't gotten any more since 6 months ago. EP reported symptoms of pressure in his chest, muscle pain after engaging in activities, cloudy vision, and slight hearing changes. EP checks his blood pressure every day at home and reported his most recent blood pressure to be 224/144. EP also reports checking his blood glucose levels everyday too. When he was taking his medications he reported that his blood pressure was around 150/100.       Current Outpatient Medications   Medication Sig Dispense Refill    atorvastatin (LIPITOR) 40 MG tablet Take 1 tablet (40 mg) by mouth At Bedtime 30 tablet 0    hydrochlorothiazide (MICROZIDE) 12.5 MG capsule Take 1 capsule (12.5 mg) by mouth every morning 30 capsule 0    ibuprofen (ADVIL/MOTRIN) 200 MG tablet Take 200 mg by mouth every 4 hours as needed      losartan (COZAAR) 25 MG tablet Take 1 tablet (25 mg) by mouth daily 30 tablet 0    metFORMIN (GLUCOPHAGE) 500 MG tablet Take 1 tablet (500 mg) by mouth daily (with breakfast) 30 tablet 0          Objective:  There were no vitals taken for this visit.    Blood pressure: 230/144    Assessment:     Hypertension: Uncontrolled  DTP: Convenience: drug product not available   Rationale: EP was taking 1 tablet losartan 25 mg by mouth daily for his hypertension. He reports not having any more of this medication and last used it 6 months ago.     Hypertension: Uncontrolled  DTP: Convenience: drug product not available  Rational: EP was taking 1 tablet 12.5 mg of hydrochlorothiazide by mouth every morning. He reports not having any more of this medication and last used it 6 months ago.     Diabetes Type 2: Uncontrolled  DTP: Convenience: drug product not available   Rationale: EP was taking metformin 500 mg tablets by mouth daily with breakfast for his diabetes. He reports not having any more of this medication and last used it 6 months ago.     Cholesterol: Uncontrolled  DTP: Convenience: drug product not available   Rationale: EP was taking atorvastatin 40 mg tablets by mouth daily. EP has no more of this medication and last used it 6 months ago.       Plan:  Resume taking losartan 25 mg daily  Resume taking hydrochlorothiazide 12.5 mg every morning  Resume taking atorvastatin 40 mg at bedtime   Resume taking metformin 500 mg daily with breakfast  Continue monitoring and reporting blood pressure  Follow up with [facility] in a week    Follow-Up:  Follow up with [facility] in a week to see if his blood pressure has been lowered    Pharmacy Clinician: Oli Treadwell  Pharmacy Preceptor: Clayton Barragan PharmD, GLENDY      _____________________________  Preceptor Use Only:  In supervising the student, I have reviewed and verified the student's documentation and found it to be correct and complete.   Preceptor Signature:   I have reviewed and agree with the intern's note.    Clayton Barragan PharmD, GLENDY

## 2024-09-17 LAB
CREAT UR-MCNC: 171 MG/DL
MICROALBUMIN UR-MCNC: 459 MG/L
MICROALBUMIN/CREAT UR: 268.42 MG/G CR (ref 0–17)

## 2024-09-17 NOTE — NURSING NOTE
"[facility] Nursing Progress Note    Chief Complaint: High Blood Pressure    Shon HIRSCH, a 59 year old male, presented to clinic with reports of high blood pressure. He states he has had high blood pressure since he was 33 years old and ran out of his prescribed hypertension medications 6 months ago. He checked his blood pressure this morning at home and it was 224/144. He reports headaches, dizziness, and cloudy vision since being off his hypertension medications. Upon assessment, his blood pressure was 230/134 with an automatic monitor and 200/130 manually.     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 Patient reports that he currently has no medical insurance.        PHQ2 Score: 2      Nutrition Screen (Two item screener 2019):   \"Within the past 12 months, we worried whether our food would run out before we got money to buy more\" Never true  \"In the last 12 months, we couldn't afford to eat balanced meals\" Sometimes true        Nursing Clinician: Catherine Justin  Nursing Preceptor: Lela Loaiza RN  "

## 2024-09-17 NOTE — PROGRESS NOTES
"  NUTRITION PROGRESS NOTE - ADIME    Time Spent: 15 minutes    Shon Adan is a 59 year old male who presents with past medical history as follows No past medical history on file.    Reason for assessment: Chewing difficulties     ASSESSMENT:    Food/Nutrition-Related History: Pt eats 3 meals/d. Pt has little time to consume breakfast (less than 15 min) and lunch (less than 20 min) plus chewing difficulties. Both are are leading to high stress eating environments. 24 hr recall: B: coffee, fruit, granola bar (unable to eat anymore), gelatin, sometimes only drinks coffee due to chewing difficulties; L: ground meat, pureed potatoes or beans, pasta, cooked vegetables; D: same thing as lunch. Pt expressed wanting recommendations for easy to chew foods to decrease meal times.    Anthropometric measures: None    5' 5\"  189 lbs 6.4 oz    Wt Readings from Last 2 Encounters:   09/16/24 85.9 kg (189 lb 6.4 oz)   02/15/18 95.3 kg (210 lb)       Biochemical Data, Medical Tests and Procedures:    Last Comprehensive Metabolic Panel:  Sodium   Date Value Ref Range Status   02/15/2018 141 133 - 144 mmol/L Final     Potassium   Date Value Ref Range Status   02/15/2018 3.3 (L) 3.4 - 5.3 mmol/L Final     Chloride   Date Value Ref Range Status   02/15/2018 102 94 - 109 mmol/L Final     Carbon Dioxide   Date Value Ref Range Status   02/15/2018 28 20 - 32 mmol/L Final     Anion Gap   Date Value Ref Range Status   02/15/2018 11 3 - 14 mmol/L Final     Glucose   Date Value Ref Range Status   02/15/2018 339 (H) 70 - 99 mg/dL Final     Urea Nitrogen   Date Value Ref Range Status   02/15/2018 12 7 - 30 mg/dL Final     Creatinine   Date Value Ref Range Status   02/15/2018 0.64 (L) 0.66 - 1.25 mg/dL Final     GFR Estimate   Date Value Ref Range Status   02/15/2018 >90 >60 mL/min/1.7m2 Final     Comment:     Non  GFR Calc     Calcium   Date Value Ref Range Status   02/15/2018 8.6 8.5 - 10.1 mg/dL Final       BP (!) 200/130 (BP " "Location: Left arm)   Pulse 76   Temp 97.9  F (36.6  C) (Oral)   Resp 18   Ht 1.651 m (5' 5\")   Wt 85.9 kg (189 lb 6.4 oz)   SpO2 97%   BMI 31.52 kg/m      DIAGNOSIS:  Chewing difficulty related to dental changes as evidenced by self-report.    INTERVENTION:    Nutrition Counseling: Pt educated on balancing out meals (protein, fat, colors, carb)  and advised not skipping breakfast to manage blood sugar levels. Also, educated pt on buying low-sodium canned beans and vegetables and not adding salt to foods. Provided pt with easy to chew foods and meal ideas.    Those present during counseling: Nutrition Clinician    Nutrition Prescription: Easy to chew foods integrated into consistent-CHO and DASH diet.    Detail of Intervention/Plan: Recommended pt eat soft fruits (watermelon, papaya, banana, avocado), cooked or boiled vegetables, mashed or blended beans or potatoes, and yogurt. Suggested meal ideas, like bread with mashed avocado and scrambled eggs, smoothies made with milk or yogurt, and greek yogurt and fruit for fast and easy breakfast and/or lunch ideas for work.    Understanding of diet demonstrated: Pt understood recommendations    Predicted compliance: compliant most of the time      MONITORING AND EVALUATION:    [facility] is a free student-run clinic, so timely monitoring is unlikely. If patient is able to follow-up or return to clinic, the following can be re-evaluated:    Nutrition Monitoring: Food,nutrient or energy intake and Labs. Consultation focused on chewing difficulties; monitor chewing ability, and carb and sodium intake r/t HTN and DM.    NUTRITION TEAM:    Nutrition Clinician: Nandini Barry  Preceptor: Kayce Singer    In supervising the nutrition student, I repeated the exam documented above. I have reviewed and verified and student's documentation.     Supervising Provider: ***      "

## 2024-09-17 NOTE — PATIENT INSTRUCTIONS
Patient Visit Summary    Patient Name: Shon Adan  MRN: [id number]    Date of Visit: 9/16/2024    Principle Diagnosis: High Blood Pressure    Physician's Recommendations/Instructions: Physician medications for high blood pressure and diabetes. Take as recommended.      Lab Tests Performed: Blood tests preformed (BMP, Lipid panel, A1C) and Urine analysis    Follow Up/Results: Medical preceptor will call with results from the lab tests.     Referrals and Instructions: Follow up at [facility] clinic in one week    Physician: Dr. Perry